# Patient Record
Sex: MALE | Race: BLACK OR AFRICAN AMERICAN | NOT HISPANIC OR LATINO | Employment: UNEMPLOYED | URBAN - METROPOLITAN AREA
[De-identification: names, ages, dates, MRNs, and addresses within clinical notes are randomized per-mention and may not be internally consistent; named-entity substitution may affect disease eponyms.]

---
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
## 2024-12-26 ENCOUNTER — HOSPITAL ENCOUNTER (EMERGENCY)
Facility: HOSPITAL | Age: 19
Discharge: HOME | End: 2024-12-27
Attending: EMERGENCY MEDICINE
Payer: MEDICAID

## 2024-12-26 DIAGNOSIS — B34.9 VIRAL ILLNESS: Primary | ICD-10-CM

## 2024-12-26 DIAGNOSIS — A08.4 VIRAL GASTROENTERITIS: ICD-10-CM

## 2024-12-26 PROCEDURE — 25000003 PHARM REV CODE 250: Performed by: PHYSICIAN ASSISTANT

## 2024-12-26 PROCEDURE — 87635 SARS-COV-2 COVID-19 AMP PRB: CPT | Performed by: PHYSICIAN ASSISTANT

## 2024-12-26 PROCEDURE — 99284 EMERGENCY DEPT VISIT MOD MDM: CPT | Mod: 25

## 2024-12-26 RX ORDER — ONDANSETRON 4 MG/1
4 TABLET, ORALLY DISINTEGRATING ORAL
Status: COMPLETED | OUTPATIENT
Start: 2024-12-26 | End: 2024-12-26

## 2024-12-26 RX ORDER — ACETAMINOPHEN 500 MG
1000 TABLET ORAL
Status: COMPLETED | OUTPATIENT
Start: 2024-12-26 | End: 2024-12-26

## 2024-12-26 RX ADMIN — ONDANSETRON 4 MG: 4 TABLET, ORALLY DISINTEGRATING ORAL at 11:12

## 2024-12-26 RX ADMIN — ACETAMINOPHEN 1000 MG: 500 TABLET, FILM COATED ORAL at 11:12

## 2024-12-27 VITALS
TEMPERATURE: 99 F | OXYGEN SATURATION: 98 % | HEART RATE: 116 BPM | HEIGHT: 68 IN | WEIGHT: 145 LBS | SYSTOLIC BLOOD PRESSURE: 112 MMHG | DIASTOLIC BLOOD PRESSURE: 60 MMHG | BODY MASS INDEX: 21.98 KG/M2 | RESPIRATION RATE: 20 BRPM

## 2024-12-27 LAB
CTP QC/QA: YES
MOLECULAR STREP A: NEGATIVE
POC MOLECULAR INFLUENZA A AGN: NEGATIVE
POC MOLECULAR INFLUENZA B AGN: NEGATIVE
SARS-COV-2 RDRP RESP QL NAA+PROBE: NEGATIVE

## 2024-12-27 PROCEDURE — 87651 STREP A DNA AMP PROBE: CPT

## 2024-12-27 PROCEDURE — 87502 INFLUENZA DNA AMP PROBE: CPT

## 2024-12-27 RX ORDER — ONDANSETRON 4 MG/1
4 TABLET, ORALLY DISINTEGRATING ORAL EVERY 6 HOURS PRN
Qty: 20 TABLET | Refills: 0 | Status: SHIPPED | OUTPATIENT
Start: 2024-12-27 | End: 2024-12-27

## 2024-12-27 RX ORDER — GUAIFENESIN AND DEXTROMETHORPHAN HYDROBROMIDE 10; 100 MG/5ML; MG/5ML
10 SYRUP ORAL 4 TIMES DAILY PRN
Qty: 120 ML | Refills: 0 | Status: SHIPPED | OUTPATIENT
Start: 2024-12-27 | End: 2024-12-27

## 2024-12-27 RX ORDER — CETIRIZINE HYDROCHLORIDE 10 MG/1
10 TABLET ORAL DAILY
Qty: 10 TABLET | Refills: 0 | Status: SHIPPED | OUTPATIENT
Start: 2024-12-27 | End: 2025-01-06

## 2024-12-27 RX ORDER — CETIRIZINE HYDROCHLORIDE 10 MG/1
10 TABLET ORAL DAILY
Qty: 10 TABLET | Refills: 0 | Status: SHIPPED | OUTPATIENT
Start: 2024-12-27 | End: 2024-12-27

## 2024-12-27 RX ORDER — GUAIFENESIN AND DEXTROMETHORPHAN HYDROBROMIDE 10; 100 MG/5ML; MG/5ML
10 SYRUP ORAL 4 TIMES DAILY PRN
Qty: 120 ML | Refills: 0 | Status: SHIPPED | OUTPATIENT
Start: 2024-12-27 | End: 2025-01-06

## 2024-12-27 RX ORDER — ONDANSETRON 4 MG/1
4 TABLET, ORALLY DISINTEGRATING ORAL EVERY 6 HOURS PRN
Qty: 20 TABLET | Refills: 0 | Status: SHIPPED | OUTPATIENT
Start: 2024-12-27

## 2024-12-27 NOTE — DISCHARGE INSTRUCTIONS
Drink lots of fluids, stay well hydrated.  Zofran as needed for nausea.  Tylenol/Ibuprofen as needed for discomfort; go back and forth between these two medications every 4 hrs as needed for temp greater than or equal to 100.4F, as needed for congestion/headache/body aches.  Zyrtec to help with congestion.  Robitussin for cough.  You can use over-the-counter throat lozenges to see if any improvement of your sore throat.    Follow-up with your primary care provider for reevaluation, further recommendations. Return to this ED if unable to treat a fever, if symptoms persist or worsen despite treatment, if you begin with shortness of breath or difficulty breathing, if no longer eating or drinking, if you continue with vomiting, if any other problems occur.

## 2024-12-27 NOTE — ED PROVIDER NOTES
Encounter Date: 12/26/2024       History     Chief Complaint   Patient presents with    Generalized Body Aches     Pt arrived via POV with mother, pt states he has body aches X ishaan frazier.      19-year-old male presents to ED with mom with chief complaint 3 day history of nausea vomiting diarrhea, cough, odynophagia, congestion, rhinorrhea, fatigue, fever chills myalgias, generally feeling unwell.    Mom with similar symptoms.  Decreased appetite and intake since onset of symptoms.  Admits to associated right-sided upper abdominal pain.  No urinary complaints.  No flank pain. Denies history of GERD or gallbladder issues.  Symptoms are acute, constant, moderate.  No alleviating or exacerbating factors.  No radiation of symptoms.    No history of any abdominal surgeries      PMH:  Asperger's syndrome  Autism       Review of patient's allergies indicates:  No Known Allergies  Past Medical History:   Diagnosis Date    Asperger's syndrome     Autism      Past Surgical History:   Procedure Laterality Date    MOUTH SURGERY       No family history on file.  Social History     Tobacco Use    Smoking status: Never    Smokeless tobacco: Never   Substance Use Topics    Alcohol use: No    Drug use: No     Review of Systems   Constitutional:  Positive for appetite change, chills, fatigue and fever.   HENT:  Positive for congestion, rhinorrhea and sore throat.    Eyes:  Negative for discharge and redness.   Respiratory:  Positive for cough.    Gastrointestinal:  Positive for abdominal pain, diarrhea, nausea and vomiting.   Genitourinary:  Negative for decreased urine volume, dysuria and flank pain.   Musculoskeletal:  Positive for myalgias. Negative for neck pain and neck stiffness.   Neurological:  Negative for syncope.       Physical Exam     Initial Vitals [12/26/24 2140]   BP Pulse Resp Temp SpO2   117/62 (!) 126 17 99.3 °F (37.4 °C) 100 %      MAP       --         Physical Exam    Nursing note and vitals  reviewed.  Constitutional: He appears well-developed and well-nourished. He is not diaphoretic. No distress.   Ill appearing nontoxic.  Speaking in full sentences without pause or difficulty.   HENT:   Head: Normocephalic and atraumatic.   Cobblestoning to posterior oropharynx, tacky mucous membranes   Neck: Neck supple.   Normal range of motion.  Cardiovascular:            Sinus tachycardia   Pulmonary/Chest: No respiratory distress.   Abdominal: Abdomen is soft. Bowel sounds are normal. He exhibits no distension.   Mild tenderness to right upper quadrant, epigastric abdomen.   Musculoskeletal:         General: No tenderness. Normal range of motion.      Cervical back: Normal range of motion and neck supple.     Neurological: He is alert and oriented to person, place, and time.   Psychiatric: Thought content normal.   Flat affect         ED Course   Procedures  Labs Reviewed   POCT STREP A MOLECULAR       Result Value    Molecular Strep A, POC Negative       Acceptable Yes     POCT INFLUENZA A/B MOLECULAR    POC Molecular Influenza A Ag Negative      POC Molecular Influenza B Ag Negative       Acceptable Yes     SARS-COV-2 RDRP GENE    POC Rapid COVID Negative       Acceptable Yes            Imaging Results              X-Ray Chest AP Portable (Final result)  Result time 12/26/24 23:28:56      Final result by Vaughn Lake MD (12/26/24 23:28:56)                   Impression:      No acute cardiopulmonary process identified.      Electronically signed by: Vaughn Lake MD  Date:    12/26/2024  Time:    23:28               Narrative:    EXAMINATION:  XR CHEST AP PORTABLE    CLINICAL HISTORY:  cough;    TECHNIQUE:  Single frontal view of the chest was performed.    COMPARISON:  December 2019.    FINDINGS:  Cardiac silhouette is normal in size.  Lungs are symmetrically expanded.  No evidence of focal consolidative process, pneumothorax, or significant pleural effusion.   No acute osseous abnormality identified.                                    X-Rays:   Independently Interpreted Readings:   Chest X-Ray: Personal interpretation:  No significant cardiomegaly, no convincing effusion, no obvious pneumothorax, no dense peripheral lobar consolidation.     Medications   acetaminophen tablet 1,000 mg (1,000 mg Oral Given 12/26/24 3388)   ondansetron disintegrating tablet 4 mg (4 mg Oral Given 12/26/24 2331)     Medical Decision Making  Differential diagnosis: URI, viral illness, pneumonia, bronchitis, pharyngitis, enteritis, colitis, GERD, gastritis, cholecystitis, diverticulosis, diverticulitis, dehydration    Amount and/or Complexity of Data Reviewed  External Data Reviewed: notes.  Discussion of management or test interpretation with external provider(s): Mom tested positive for flu.  Patient with similar symptoms, likely associated viral illness.  No significant comorbidities.    Risk  OTC drugs.  Prescription drug management.               ED Course as of 12/27/24 0502   Fri Dec 27, 2024   0053 Despite negative influenza, given mother with similar symptoms, do suspect he also has flu or least a viral URI with associated viral gastroenteritis.  Otherwise healthy with no significant comorbidities.  He is tolerating p.o..  Heart rate only slightly improved on re-evaluation.  Strongly encouraged vigorous p.o. hydration,, appropriate fever control with family member and mom, they are comfortable with current plan.  They do feel comfortable with discharge and follow up with the outpatient setting, attempted supportive/symptomatic measures at home.  [SM]      ED Course User Index  [SM] Jamie Calderon, TANIA                           Clinical Impression:  Final diagnoses:  [B34.9] Viral illness (Primary)  [A08.4] Viral gastroenteritis          ED Disposition Condition    Discharge Stable          ED Prescriptions       Medication Sig Dispense Start Date End Date Auth. Provider     ondansetron (ZOFRAN-ODT) 4 MG TbDL  (Status: Discontinued) Take 1 tablet (4 mg total) by mouth every 6 (six) hours as needed (Nausea). 20 tablet 12/27/2024 12/27/2024 Jamie Calderon PA-C    dextromethorphan-guaiFENesin  mg/5 ml (ROBITUSSIN-DM)  mg/5 mL liquid  (Status: Discontinued) Take 10 mLs by mouth 4 (four) times daily as needed (cough). 120 mL 12/27/2024 12/27/2024 Jamie Calderon PA-C    cetirizine (ZYRTEC) 10 MG tablet  (Status: Discontinued) Take 1 tablet (10 mg total) by mouth once daily. for 10 days 10 tablet 12/27/2024 12/27/2024 Jamie Calderon PA-C    cetirizine (ZYRTEC) 10 MG tablet Take 1 tablet (10 mg total) by mouth once daily. for 10 days 10 tablet 12/27/2024 1/6/2025 Jamie Calderon PA-C    dextromethorphan-guaiFENesin  mg/5 ml (ROBITUSSIN-DM)  mg/5 mL liquid Take 10 mLs by mouth 4 (four) times daily as needed (cough). 120 mL 12/27/2024 1/6/2025 Jamie Calderon PA-C    ondansetron (ZOFRAN-ODT) 4 MG TbDL Take 1 tablet (4 mg total) by mouth every 6 (six) hours as needed (Nausea). 20 tablet 12/27/2024 -- Jamie Calderon PA-C          Follow-up Information       Follow up With Specialties Details Why Contact Info    Tray Granados MD Pediatrics Schedule an appointment as soon as possible for a visit  For reevaluation, If symptoms persist [address]  [place] [address]  [phone]               Jamie Calderon PA-C  12/27/24 [id number]

## 2024-12-30 ENCOUNTER — TELEPHONE (OUTPATIENT)
Dept: ADMINISTRATIVE | Facility: CLINIC | Age: 19
End: 2024-12-30
Payer: MEDICAID